# Patient Record
Sex: FEMALE | HISPANIC OR LATINO | Employment: FULL TIME | URBAN - METROPOLITAN AREA
[De-identification: names, ages, dates, MRNs, and addresses within clinical notes are randomized per-mention and may not be internally consistent; named-entity substitution may affect disease eponyms.]

---

## 2024-11-02 ENCOUNTER — OFFICE VISIT (OUTPATIENT)
Dept: URGENT CARE | Facility: CLINIC | Age: 29
End: 2024-11-02
Payer: COMMERCIAL

## 2024-11-02 ENCOUNTER — APPOINTMENT (OUTPATIENT)
Dept: RADIOLOGY | Facility: CLINIC | Age: 29
End: 2024-11-02
Payer: COMMERCIAL

## 2024-11-02 VITALS
BODY MASS INDEX: 47.55 KG/M2 | WEIGHT: 260 LBS | TEMPERATURE: 98.6 F | DIASTOLIC BLOOD PRESSURE: 60 MMHG | RESPIRATION RATE: 16 BRPM | SYSTOLIC BLOOD PRESSURE: 117 MMHG | OXYGEN SATURATION: 99 % | HEART RATE: 92 BPM

## 2024-11-02 DIAGNOSIS — J20.9 ACUTE BRONCHITIS, UNSPECIFIED ORGANISM: Primary | ICD-10-CM

## 2024-11-02 DIAGNOSIS — R05.2 SUBACUTE COUGH: ICD-10-CM

## 2024-11-02 PROCEDURE — 99213 OFFICE O/P EST LOW 20 MIN: CPT | Performed by: FAMILY MEDICINE

## 2024-11-02 PROCEDURE — 71046 X-RAY EXAM CHEST 2 VIEWS: CPT

## 2024-11-02 RX ORDER — BUSPIRONE HYDROCHLORIDE 15 MG/1
1 TABLET ORAL 2 TIMES DAILY
COMMUNITY
Start: 2024-10-11

## 2024-11-02 RX ORDER — TRAZODONE HYDROCHLORIDE 100 MG/1
200 TABLET ORAL
COMMUNITY
Start: 2024-10-10

## 2024-11-02 RX ORDER — ARIPIPRAZOLE 400 MG
400 KIT INTRAMUSCULAR
COMMUNITY
Start: 2024-10-08

## 2024-11-02 RX ORDER — ALBUTEROL SULFATE 90 UG/1
2 INHALANT RESPIRATORY (INHALATION) EVERY 6 HOURS PRN
Qty: 6.7 G | Refills: 0 | Status: SHIPPED | OUTPATIENT
Start: 2024-11-02

## 2024-11-02 RX ORDER — PREDNISONE 20 MG/1
20 TABLET ORAL EVERY MORNING
Qty: 5 TABLET | Refills: 0 | Status: SHIPPED | OUTPATIENT
Start: 2024-11-02 | End: 2024-11-07

## 2024-11-02 NOTE — LETTER
November 2, 2024     Patient: Marcela Carlos   YOB: 1995   Date of Visit: 11/2/2024       To Whom It May Concern:    Marcela Carlos was evaluated in my office on 11/2/2024.  Please excuse her absence.  She may return to work on 11/5/2024 if symptoms are improved.  If you have any questions or concerns, please don't hesitate to call.         Sincerely,        Salvatore Rowley MD    CC: No Recipients

## 2024-11-02 NOTE — PROGRESS NOTES
[facility]        NAME: Marcela Carlos is a 28 y.o. female  : 1995    MRN: [id number]  DATE: 2024  TIME: 2:22 PM    Assessment and Plan   Acute bronchitis, unspecified organism [J20.9]  1. Acute bronchitis, unspecified organism  XR chest pa and lateral    predniSONE 20 mg tablet    albuterol (Proventil HFA) 90 mcg/act inhaler        Likely experiencing a combination of acute bronchitis and a viral postnasal drip per clinical evaluation.  Chest x-ray appears unremarkable; official read pending.  Will treat with a steroid burst x 5 days and as needed albuterol.    Patient Instructions     Follow up with PCP in 3-5 days.  Proceed to  ER if symptoms worsen.    If tests have been performed at [facility], our office will contact you with results if changes need to be made to the care plan discussed with you at the visit.  [facility]    Chief Complaint     Chief Complaint   Patient presents with    Cough     Reports productive cough x 2-3 weeks and congestion x 2 weeks. New onset dyspnea that started 2 days ago. Using Afrin that is no longer helpful as well as cough drops.          History of Present Illness       28-year-old female presents today with a few weeks of URI symptoms including nasal congestion, coughing, rhinorrhea and more recently some shortness of breath.  Has been experiencing chills, but denies any fevers, abdominal symptoms or chest pain.  Reports that others at work about similar symptoms prior to the onset of hers.    Cough  Associated symptoms include chills, headaches, rhinorrhea, a sore throat (mild) and shortness of breath. Pertinent negatives include no chest pain or fever. Her past medical history is significant for environmental allergies (spring).       Review of Systems   Review of Systems   Constitutional:  Positive for chills. Negative for fever.   HENT:  Positive for congestion, rhinorrhea and sore throat (mild).     Respiratory:  Positive for cough and shortness of breath.    Cardiovascular:  Negative for chest pain.   Gastrointestinal:  Negative for abdominal pain and nausea.   Allergic/Immunologic: Positive for environmental allergies (spring).   Neurological:  Positive for dizziness and headaches.     Current Medications       Current Outpatient Medications:     Abilify Maintena 400 MG SRER, Inject 400 mg into a muscle, Disp: , Rfl:     albuterol (Proventil HFA) 90 mcg/act inhaler, Inhale 2 puffs every 6 (six) hours as needed for wheezing or shortness of breath, Disp: 6.7 g, Rfl: 0    busPIRone (BUSPAR) 15 mg tablet, Take 1 tablet by mouth 2 (two) times a day, Disp: , Rfl:     hydrOXYzine pamoate (VISTARIL) 50 mg capsule, Take 50 mg by mouth 3 (three) times a day as needed, Disp: , Rfl:     ondansetron (ZOFRAN-ODT) 4 mg disintegrating tablet, Take 1 tablet (4 mg total) by mouth every 12 (twelve) hours as needed for nausea or vomiting, Disp: 20 tablet, Rfl: 6    prazosin (MINIPRESS) 2 mg capsule, Take 1 capsule by mouth daily at bedtime, Disp: , Rfl:     predniSONE 20 mg tablet, Take 1 tablet (20 mg total) by mouth every morning for 5 days, Disp: 5 tablet, Rfl: 0    SUMAtriptan (IMITREX) 25 mg tablet, Take 1 tablet (25 mg total) by mouth if needed for migraine Take 1 tablet (25mg total) by mouth once as needed for migraine no more than 2-3x a week, Disp: 12 tablet, Rfl: 1    tiZANidine (ZANAFLEX) 2 mg tablet, TAKE 1 TAB DAILY AS NEEDED FOR MUSCLE SPASMS. TAKE 2 TABS DAILY AT BED AS NEEDED FOR MUSCLE SPASMS., Disp: 90 tablet, Rfl: 5    topiramate (TOPAMAX) 50 MG tablet, Take 1 tablet (50 mg total) by mouth daily AND 2 tablets (100 mg total) daily at bedtime., Disp: 270 tablet, Rfl: 3    traZODone (DESYREL) 100 mg tablet, Take 200 mg by mouth daily at bedtime, Disp: , Rfl:     ARIPiprazole (ABILIFY) 10 mg tablet, Take 10 mg by mouth every morning (Patient not taking: Reported on 11/2/2024), Disp: , Rfl:     predniSONE 20 mg  tablet, 2 tabs at breakfast and one tab at lunch for 14 days (Patient not taking: Reported on 4/17/2024), Disp: 42 tablet, Rfl: 0    traZODone (DESYREL) 150 mg tablet, Take 150 mg by mouth daily at bedtime (Patient not taking: Reported on 11/2/2024), Disp: , Rfl:     Current Allergies     Allergies as of 11/02/2024 - Reviewed 11/02/2024   Allergen Reaction Noted    Ham - food allergy Hives 04/04/2022    Horse-derived products Swelling 04/04/2022    Lamotrigine Rash 11/18/2022            The following portions of the patient's history were reviewed and updated as appropriate: allergies, current medications, past family history, past medical history, past social history, past surgical history and problem list.     Past Medical History:   Diagnosis Date    Anxiety     Bipolar depression (HCC)     Dental disease Unknown    As long as i can remember    HL (hearing loss) 12/12/21    Happens a lot with my migraines    Low blood sugar     Migraine     PTSD (post-traumatic stress disorder)     Tonsillitis June 12,2022       Past Surgical History:   Procedure Laterality Date    WISDOM TOOTH EXTRACTION         Family History   Problem Relation Age of Onset    No Known Problems Mother     No Known Problems Father          Medications have been verified.        Objective   /60   Pulse 92   Temp 98.6 °F (37 °C) (Tympanic)   Resp 16   Wt 118 kg (260 lb)   LMP 10/16/2024 (Exact Date)   SpO2 99%   BMI 47.55 kg/m²   Patient's last menstrual period was 10/16/2024 (exact date).       Physical Exam     Physical Exam  Vitals and nursing note reviewed.   Constitutional:       General: She is in acute distress.      Appearance: Normal appearance. She is not ill-appearing, toxic-appearing or diaphoretic.   HENT:      Head: Normocephalic and atraumatic.      Nose: Congestion and rhinorrhea present.      Comments: Inflamed nasal mucosa     Mouth/Throat:      Mouth: Mucous membranes are moist.      Pharynx: No posterior  oropharyngeal erythema.   Eyes:      General:         Right eye: No discharge.         Left eye: No discharge.      Conjunctiva/sclera: Conjunctivae normal.   Cardiovascular:      Rate and Rhythm: Normal rate and regular rhythm.   Pulmonary:      Effort: Pulmonary effort is normal. No respiratory distress.      Breath sounds: Normal breath sounds. No wheezing, rhonchi or rales.   Skin:     General: Skin is warm.      Findings: No erythema.   Neurological:      General: No focal deficit present.      Mental Status: She is alert and oriented to person, place, and time.   Psychiatric:         Mood and Affect: Mood normal.         Behavior: Behavior normal.         Thought Content: Thought content normal.         Judgment: Judgment normal.

## 2025-04-10 DIAGNOSIS — M62.89 MUSCLE STIFFNESS: ICD-10-CM

## 2025-04-11 RX ORDER — TIZANIDINE 2 MG/1
TABLET ORAL
Qty: 90 TABLET | Refills: 5 | Status: SHIPPED | OUTPATIENT
Start: 2025-04-11

## 2025-05-15 DIAGNOSIS — G43.009 MIGRAINE WITHOUT AURA AND WITHOUT STATUS MIGRAINOSUS, NOT INTRACTABLE: ICD-10-CM

## 2025-05-15 NOTE — TELEPHONE ENCOUNTER
Script last sent 4/17/24  LOV-4/17/24  Next OV-none    Refill entered, please sign off if agreeable.    [employer] message sent to pt to call office to schedule appt

## 2025-05-19 RX ORDER — TOPIRAMATE 50 MG/1
TABLET, FILM COATED ORAL
Qty: 270 TABLET | Refills: 0 | Status: SHIPPED | OUTPATIENT
Start: 2025-05-19

## 2025-06-10 ENCOUNTER — TELEPHONE (OUTPATIENT)
Age: 30
End: 2025-06-10

## 2025-06-10 NOTE — TELEPHONE ENCOUNTER
Patient confirmed the 06/17 appointment   Patient did confirm knowledge of the new office address.

## 2025-06-17 ENCOUNTER — OFFICE VISIT (OUTPATIENT)
Age: 30
End: 2025-06-17
Payer: COMMERCIAL

## 2025-06-17 VITALS
WEIGHT: 265 LBS | HEIGHT: 62 IN | BODY MASS INDEX: 48.76 KG/M2 | HEART RATE: 103 BPM | DIASTOLIC BLOOD PRESSURE: 70 MMHG | SYSTOLIC BLOOD PRESSURE: 100 MMHG

## 2025-06-17 DIAGNOSIS — G43.009 MIGRAINE WITHOUT AURA AND WITHOUT STATUS MIGRAINOSUS, NOT INTRACTABLE: Primary | ICD-10-CM

## 2025-06-17 DIAGNOSIS — M54.2 CERVICALGIA: ICD-10-CM

## 2025-06-17 DIAGNOSIS — R11.0 NAUSEA: ICD-10-CM

## 2025-06-17 DIAGNOSIS — M62.89 MUSCLE STIFFNESS: ICD-10-CM

## 2025-06-17 PROCEDURE — 99213 OFFICE O/P EST LOW 20 MIN: CPT | Performed by: NURSE PRACTITIONER

## 2025-06-17 RX ORDER — BUSPIRONE HYDROCHLORIDE 5 MG/1
1 TABLET ORAL 2 TIMES DAILY
COMMUNITY
Start: 2025-05-22

## 2025-06-17 RX ORDER — TOPIRAMATE 50 MG/1
TABLET, FILM COATED ORAL
Qty: 270 TABLET | Refills: 3 | Status: SHIPPED | OUTPATIENT
Start: 2025-06-17

## 2025-06-17 RX ORDER — SUMATRIPTAN SUCCINATE 25 MG/1
25 TABLET ORAL AS NEEDED
Qty: 12 TABLET | Refills: 1 | Status: SHIPPED | OUTPATIENT
Start: 2025-06-17

## 2025-06-17 RX ORDER — ONDANSETRON 4 MG/1
4 TABLET, ORALLY DISINTEGRATING ORAL EVERY 12 HOURS PRN
Qty: 10 TABLET | Refills: 6 | Status: SHIPPED | OUTPATIENT
Start: 2025-06-17

## 2025-06-17 RX ORDER — QUETIAPINE FUMARATE 200 MG/1
200 TABLET, FILM COATED ORAL
COMMUNITY
Start: 2025-05-07

## 2025-06-17 NOTE — ASSESSMENT & PLAN NOTE
Orders:    ondansetron (ZOFRAN-ODT) 4 mg disintegrating tablet; Take 1 tablet (4 mg total) by mouth every 12 (twelve) hours as needed for nausea or vomiting

## 2025-06-17 NOTE — ASSESSMENT & PLAN NOTE
Orders:    ondansetron (ZOFRAN-ODT) 4 mg disintegrating tablet; Take 1 tablet (4 mg total) by mouth every 12 (twelve) hours as needed for nausea or vomiting    SUMAtriptan (IMITREX) 25 mg tablet; Take 1 tablet (25 mg total) by mouth if needed for migraine Take 1 tablet (25mg total) by mouth once as needed for migraine no more than 2-3x a week    topiramate (TOPAMAX) 50 MG tablet; Take 1 tablet (50 mg total) by mouth daily AND 2 tablets (100 mg total) daily at bedtime.

## 2025-06-17 NOTE — PATIENT INSTRUCTIONS
Continue with Topamax 50mg in the am and 100mg in the afternoon  Can use the sumatriptan at the onset of migraine activity  Can use tizanidine for neck tightness and pain  Can use the zofran for migraine related nausea  Follow up with Neurology office in 1yr or sooner if needed

## 2025-06-17 NOTE — PROGRESS NOTES
Name: Marcela Carlos      : 1995      MRN: [id number]  Encounter Provider: ALIDA Mack  Encounter Date: 2025   Encounter [facility]: [facility]  :  Assessment & Plan  Migraine without aura and without status migrainosus, not intractable    Orders:    ondansetron (ZOFRAN-ODT) 4 mg disintegrating tablet; Take 1 tablet (4 mg total) by mouth every 12 (twelve) hours as needed for nausea or vomiting    SUMAtriptan (IMITREX) 25 mg tablet; Take 1 tablet (25 mg total) by mouth if needed for migraine Take 1 tablet (25mg total) by mouth once as needed for migraine no more than 2-3x a week    topiramate (TOPAMAX) 50 MG tablet; Take 1 tablet (50 mg total) by mouth daily AND 2 tablets (100 mg total) daily at bedtime.    Nausea    Orders:    ondansetron (ZOFRAN-ODT) 4 mg disintegrating tablet; Take 1 tablet (4 mg total) by mouth every 12 (twelve) hours as needed for nausea or vomiting    Muscle stiffness         Cervicalgia           Patient Instructions   Continue with Topamax 50mg in the am and 100mg in the afternoon  Can use the sumatriptan at the onset of migraine activity  Can use tizanidine for neck tightness and pain  Can use the zofran for migraine related nausea  Follow up with Neurology office in 1yr or sooner if needed     History of Present Illness   Marcela Ramírez is a 29yo female who follows with OP Neurology office for medical management of muscle stiffness and migraine.  Patient was last seen in the neurology office on 2023.  Patient is reported migraine headaches since age of 8, started with her menstrual cycle and were hormonal in nature.  Over the years that have progressed.  2018, had onset of hemiplegic type migraine in addition to her normal migraine activity.  She had associated nausea, paresthesia and extreme light sensitivities.  These headaches lasted an entire day.  Patient's pain is generally around her eyes and or holocephalic.  At  1 point patient reported approximately daily migraine activity, her mother did not recall any preventative medications in her past, her triggers or smells, noise and light.  Patient had reported tinnitus which she felt worsens when she gets her headaches does not fully resolve undergone.  She does smoke marijuana every other week which she feels helps.  She has no history of with massive salt and tizanidine for abortive therapies.  She notes improvements with her migraines kidney stones and/or glaucoma.  She is on Topamax on this regimen.  She was reporting previously approximately 2 migraine headaches per week with similar intensity.  The rizatriptan does tend to kick in within about an hour.  She uses Zofran for an antiemetic.  Patient's Topamax was optimized to 150 mg daily, her tizanidine was increased to 40 mg as needed in the evening, daytime she could use 2 mg.  Patient does have underlying psychiatric disease, she has been on Abilify, Vistaril and trazodone.  The Abilify did help to stabilize her and she felt better on the medication regimen.  She reported the Topamax improves her migraines.  She has ongoing reports of neck stiffness and tightness, does not utilize interventions such as acupuncture or chiropractic care.  She continues on her tizanidine and her Topamax which has been working well for her.  Last office appointment patient reported that her migraine activity has been fairly well-controlled on her regimen.  She was continuing the use of marijuana and looking for a PCP who manages marijuana assistance.  Patient states home exercise therapy, she has not looked into chiropractic or acupuncture type care options.  She sees psychiatric care for counseling and medical care.  Last office evaluation she reported being in a manic phase of her disorder and is following a regimen for her psychiatric team.  She is getting 1 migraine headache per month with use of Imitrex for abortive option.  She had  continued on her Topamax 50 mg in the morning, 100 mg at night and had not noticed any significant side effects.  She was using tizanidine on occasion for her cervicalgia and neck tightness with stiffness.  Occasionally she will use 2 mg if needed she would go up to the 4 mg.    Patient returns to neurology office today, she is getting approximately 1 migraine headache per month.  She notes that at times they are even less than that.  She may go every couple months.  She stated with the weather changes her migraine activity will increase.  She can take a antihistamine and/or decongestant for symptom control.  She is tolerating her Topamax well, continues 50 mg in the morning and 100 mg at night.  She has got no issues with kidney pain, hematuria and or other side effects such as paresthesia, she drinks plenty of water daily.  She does get some word finding/fogginess type sensation but feels that is related to other issues.  Patient feels as though her Topamax is managing her migraine prevention well and no changes are being made at this time.  Patient stated when she has breakthrough migraine activity, she will use sumatriptan at the onset which will resolve the migraine in approximately 10 to 15 minutes.  She has no other complaints or concerns.  Currently she denies any headache, dizziness, lightheadedness, visual changes, speech or swallowing changes, paresthesias, weaknesses, imbalances or falls.  No changes are being made to her medication regimen, she is tolerating everything well and will use Topamax 50 in the morning, 100 mg at night along with tizanidine for neck tightness and stiffness.  Sumatriptan for abortive option.  Patient to follow-up in the outpatient neurology office in 1 year or sooner if needed.          Review of Systems   Constitutional:  Negative for chills and fever.   HENT:  Negative for ear pain and sore throat.    Eyes:  Negative for pain and visual disturbance.   Respiratory:  Negative  "for cough and shortness of breath.    Cardiovascular:  Negative for chest pain and palpitations.   Gastrointestinal:  Negative for abdominal pain and vomiting.   Genitourinary:  Negative for dysuria and hematuria.   Musculoskeletal:  Positive for neck pain and neck stiffness. Negative for arthralgias and back pain.   Skin:  Negative for color change and rash.   Neurological:  Positive for dizziness and headaches. Negative for seizures and syncope.   All other systems reviewed and are negative.   I have personally reviewed the MA's review of systems and made changes as necessary.        Past Medical History[1]    Past Surgical History[2]    Social History[3]    Family History[4]    Current Medications[5]    Allergies   Allergen Reactions    Ham - Food Allergy Hives    Horse-Derived Products Swelling    Lamotrigine Rash        Blood pressure 100/70, pulse 103, height 5' 2\" (1.575 m), weight 120 kg (265 lb).       Objective   /70 (BP Location: Left arm, Patient Position: Sitting, Cuff Size: Large)   Pulse 103   Ht 5' 2\" (1.575 m)   Wt 120 kg (265 lb)   BMI 48.47 kg/m²     Physical Exam  Vitals reviewed.   Constitutional:       Appearance: Normal appearance. She is well-developed.   HENT:      Head: Normocephalic.      Nose: Nose normal.      Mouth/Throat:      Mouth: Mucous membranes are moist.     Eyes:      General: Lids are normal.      Extraocular Movements: Extraocular movements intact.      Pupils: Pupils are equal, round, and reactive to light.     Pulmonary:      Effort: Pulmonary effort is normal.   Abdominal:      Palpations: Abdomen is soft.     Musculoskeletal:      Cervical back: Normal range of motion.     Skin:     General: Skin is warm and dry.     Neurological:      Mental Status: She is alert.      Motor: Motor strength is normal.     Coordination: Romberg sign negative.      Deep Tendon Reflexes: Reflexes are normal and symmetric.     Psychiatric:         Attention and Perception: Attention " and perception normal.         Mood and Affect: Mood and affect normal.         Speech: Speech normal.         Behavior: Behavior normal. Behavior is cooperative.         Thought Content: Thought content normal.         Cognition and Memory: Cognition and memory normal.         Judgment: Judgment normal.       Neurological Exam  Mental Status  Alert. Oriented to person, place, time and situation. Memory is normal. Recent and remote memory are intact. Speech is normal. Language is fluent with no aphasia. Attention and concentration are normal. Fund of knowledge is appropriate for level of education.    Cranial Nerves  CN II: Visual acuity is normal. Visual fields full to confrontation.  CN III, IV, VI: Extraocular movements intact bilaterally. Normal lids and orbits bilaterally. Pupils equal round and reactive to light bilaterally.  CN V: Facial sensation is normal.  CN VII: Full and symmetric facial movement.  CN VIII: Hearing is normal.  CN IX, X: Palate elevates symmetrically. Normal gag reflex.  CN XI: Shoulder shrug strength is normal.  CN XII: Tongue midline without atrophy or fasciculations.    Motor  Normal muscle bulk throughout. Normal muscle tone. No abnormal involuntary movements. Strength is 5/5 throughout all four extremities.    Sensory  Light touch is normal in upper and lower extremities. Temperature is normal in upper and lower extremities. Vibration is normal in upper and lower extremities. Proprioception is normal in upper and lower extremities.     Reflexes  Deep tendon reflexes are 2+ and symmetric in all four extremities.    Right pathological reflexes: Nubia's absent.  Left pathological reflexes: Nubia's absent.    Coordination  Right: Finger-to-nose normal. Rapid alternating movement normal. Heel-to-shin normal.Left: Finger-to-nose normal. Rapid alternating movement normal. Heel-to-shin normal.    Gait  Casual gait is normal including stance, stride, and arm swing.Normal toe walking.  Normal heel walking. Normal tandem gait. Romberg is absent. Able to rise from chair without using arms.      Radiology Results Review : No pertinent imaging studies reviewed.    Administrative Statements   I have spent a total time of 20 minutes in caring for this patient on the day of the visit/encounter including Instructions for management, Patient and family education, Counseling / Coordination of care, Documenting in the medical record, Reviewing/placing orders in the medical record (including tests, medications, and/or procedures), and Obtaining or reviewing history  .         [1]   Past Medical History:  Diagnosis Date    Anxiety     Bipolar depression (HCC)     Dental disease Unknown    As long as i can remember    HL (hearing loss) 12/12/21    Happens a lot with my migraines    Low blood sugar     Migraine     PTSD (post-traumatic stress disorder)     Tonsillitis June 12,2022   [2]   Past Surgical History:  Procedure Laterality Date    WISDOM TOOTH EXTRACTION     [3]   Social History  Socioeconomic History    Marital status: Single   Tobacco Use    Smoking status: Former     Current packs/day: 0.25     Types: Cigarettes    Smokeless tobacco: Never   Vaping Use    Vaping status: Some Days    Substances: THC, CBD   Substance and Sexual Activity    Alcohol use: Yes     Comment: Once in a while not all the time    Drug use: Yes     Types: Marijuana    Sexual activity: Yes     Partners: Female, Male     Birth control/protection: Condom Male, Emergency Contraception     Social Drivers of Health     Food Insecurity: Food Insecurity Present (11/7/2022)    Received from [facility]    Hunger Vital Sign     Within the past 12 months, you worried that your food would run out before you got the money to buy more.: Sometimes true     Within the past 12 months, the food you bought just didn't last and you didn't have money to get more.: Sometimes true   Transportation Needs: No  Transportation Needs (11/7/2022)    Received from [facility]    PRAPARE - Transportation     Lack of Transportation (Medical): No     Lack of Transportation (Non-Medical): No   Social Connections: Moderately Integrated (11/7/2022)    Received from [facility]    Social Connection and Isolation Panel     In a typical week, how many times do you talk on the phone with family, friends, or neighbors?: More than three times a week     How often do you get together with friends or relatives?: Twice a week     How often do you attend Protestant or Yazidi services?: More than 4 times per year     Do you belong to any clubs or organizations such as Protestant groups, unions, fraternal or athletic groups, or school groups?: Yes     How often do you attend meetings of the clubs or organizations you belong to?: More than 4 times per year     Are you , , , , never , or living with a partner?: Never    Housing Stability: High Risk (11/7/2022)    Received from [facility]    Housing Stability Vital Sign     Unable to Pay for Housing in the Last Year: Yes     Number of Places Lived in the Last Year: 1     Unstable Housing in the Last Year: No   [4]   Family History  Problem Relation Name Age of Onset    No Known Problems Mother      No Known Problems Father     [5]   Current Outpatient Medications:     Abilify Maintena 400 MG SRER, Inject 400 mg into a muscle, Disp: , Rfl:     albuterol (Proventil HFA) 90 mcg/act inhaler, Inhale 2 puffs every 6 (six) hours as needed for wheezing or shortness of breath, Disp: 6.7 g, Rfl: 0    busPIRone (BUSPAR) 15 mg tablet, Take 1 tablet by mouth in the morning and 1 tablet before bedtime., Disp: , Rfl:     busPIRone (BUSPAR) 5 mg tablet, Take 1 tablet by mouth in the morning and 1 tablet before bedtime., Disp: , Rfl:     hydrOXYzine pamoate (VISTARIL) 50 mg capsule,  Take 50 mg by mouth as needed in the morning and 50 mg as needed at noon and 50 mg as needed in the evening., Disp: , Rfl:     ondansetron (ZOFRAN-ODT) 4 mg disintegrating tablet, Take 1 tablet (4 mg total) by mouth every 12 (twelve) hours as needed for nausea or vomiting, Disp: 10 tablet, Rfl: 6    prazosin (MINIPRESS) 2 mg capsule, Take 1 capsule by mouth daily at bedtime, Disp: , Rfl:     QUEtiapine (SEROquel) 200 mg tablet, Take 200 mg by mouth daily at bedtime, Disp: , Rfl:     SUMAtriptan (IMITREX) 25 mg tablet, Take 1 tablet (25 mg total) by mouth if needed for migraine Take 1 tablet (25mg total) by mouth once as needed for migraine no more than 2-3x a week, Disp: 12 tablet, Rfl: 1    tiZANidine (ZANAFLEX) 2 mg tablet, TAKE 1 TABLET BY MOUTH EVERY DAY AND 2 TABLET AT BEDTIME AS NEEDED FOR MUSCLE SPASMS, Disp: 90 tablet, Rfl: 5    topiramate (TOPAMAX) 50 MG tablet, Take 1 tablet (50 mg total) by mouth daily AND 2 tablets (100 mg total) daily at bedtime., Disp: 270 tablet, Rfl: 3    traZODone (DESYREL) 100 mg tablet, Take 200 mg by mouth daily at bedtime, Disp: , Rfl:     predniSONE 20 mg tablet, 2 tabs at breakfast and one tab at lunch for 14 days (Patient not taking: Reported on 6/17/2025), Disp: 42 tablet, Rfl: 0